# Patient Record
Sex: MALE | Race: BLACK OR AFRICAN AMERICAN | Employment: PART TIME | URBAN - METROPOLITAN AREA
[De-identification: names, ages, dates, MRNs, and addresses within clinical notes are randomized per-mention and may not be internally consistent; named-entity substitution may affect disease eponyms.]

---
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
## 2020-02-23 ENCOUNTER — HEALTH MAINTENANCE LETTER (OUTPATIENT)
Age: 20
End: 2020-02-23

## 2020-12-13 ENCOUNTER — HEALTH MAINTENANCE LETTER (OUTPATIENT)
Age: 20
End: 2020-12-13

## 2021-02-25 ENCOUNTER — VIRTUAL VISIT (OUTPATIENT)
Dept: FAMILY MEDICINE | Facility: CLINIC | Age: 21
End: 2021-02-25
Payer: COMMERCIAL

## 2021-02-25 DIAGNOSIS — R07.9 CHEST PAIN, UNSPECIFIED TYPE: Primary | ICD-10-CM

## 2021-02-25 PROCEDURE — 99214 OFFICE O/P EST MOD 30 MIN: CPT | Mod: 95 | Performed by: PHYSICIAN ASSISTANT

## 2021-02-25 NOTE — PROGRESS NOTES
Pelon is a 20 year old who is being evaluated via a billable telephone visit.      What phone number would you like to be contacted at? [phone]   [facility]    Assessment & Plan       ICD-10-CM    1. Chest pain, unspecified type  R07.9      Cannot rule out cardiac etiology. He will have his mom drive him to urgent care.                  No follow-ups on file.    Josephine Calderon PA-C  [facility]   Pelon is a 20 year old who presents for the following health issues     HPI         Concern for COVID-19  About how many days ago did these symptoms start? 2 days  Is this your first visit for this illness? Yes  In the 14 days before your symptoms started, have you had close contact with someone with COVID-19 (Coronavirus)? No  Do you have a fever or chills? No  Are you having new or worsening difficulty breathing? No  Do you have new or worsening cough? No  Have you had any new or unexplained body aches? YES    Have you experienced any of the following NEW symptoms?    Headache: No    Sore throat: No    Loss of taste or smell: No    Chest pain: YES    Diarrhea: No    Rash: No  What treatments have you tried? None  Who do you live with? Mother  Are you, or a household member, a healthcare worker or a ? No  Do you live in a nursing home, group home, or shelter? No  Do you have a way to get food/medications if quarantined? Yes, I have a friend or family member who can help me.    Sharp pain in his chest and left arm. Not worse with taking a deep breath  Started 3-4 days ago  About a 3/10   No exertional chest pain  It doesn't bother him at all when he's at work  No underlying health conditions    Review of Systems   CONSTITUTIONAL: NEGATIVE for fever, chills, change in weight  ENT/MOUTH: NEGATIVE for ear, mouth and throat problems  RESP: NEGATIVE for significant cough or SOB  CV: NEGATIVE for irregular heart beat and lower  extremity edema      Objective           Vitals:  No vitals were obtained today due to virtual visit.    Physical Exam   healthy, alert and no distress  PSYCH: Alert and oriented times 3; coherent speech, normal   rate and volume, able to articulate logical thoughts, able   to abstract reason, no tangential thoughts, no hallucinations   or delusions  His affect is normal  RESP: No cough, no audible wheezing, able to talk in full sentences  Remainder of exam unable to be completed due to telephone visits                Phone call duration: 5 minutes

## 2021-02-28 ENCOUNTER — OFFICE VISIT (OUTPATIENT)
Dept: URGENT CARE | Facility: URGENT CARE | Age: 21
End: 2021-02-28
Payer: COMMERCIAL

## 2021-02-28 VITALS
HEART RATE: 72 BPM | SYSTOLIC BLOOD PRESSURE: 138 MMHG | DIASTOLIC BLOOD PRESSURE: 73 MMHG | RESPIRATION RATE: 16 BRPM | OXYGEN SATURATION: 99 % | BODY MASS INDEX: 23.57 KG/M2 | WEIGHT: 150.5 LBS | TEMPERATURE: 98.5 F

## 2021-02-28 DIAGNOSIS — F41.1 GAD (GENERALIZED ANXIETY DISORDER): ICD-10-CM

## 2021-02-28 DIAGNOSIS — R07.89 ATYPICAL CHEST PAIN: Primary | ICD-10-CM

## 2021-02-28 PROCEDURE — 99214 OFFICE O/P EST MOD 30 MIN: CPT | Mod: 25 | Performed by: FAMILY MEDICINE

## 2021-02-28 PROCEDURE — 93000 ELECTROCARDIOGRAM COMPLETE: CPT | Performed by: FAMILY MEDICINE

## 2021-02-28 NOTE — LETTER
February 28, 2021      Pelon Robertson  [address]   [place] [address]        To Whom It May Concern:    Pelon Robertson  was seen on 02/28/2021.  Please excuse him from work from 02/24/2021 until 02/28/2021 due to illness.  Return to work today, 02/28/2021, with no restrictions.        Sincerely,        Vance Valdovinos MD

## 2021-02-28 NOTE — PROGRESS NOTES
Assessment & Plan     Atypical chest pain  Normal EKG, likely stress related.  Given a note to go back to work.  Patient denies depression, denies alcohol abuse or drugs abuse.  - EKG 12-lead complete w/read - Clinics    WIL (generalized anxiety disorder)  Patient declined to start any medication at this point.    Advised patient to follow-up with his primary care physician in the next 1-2  weeks or sooner to discuss option to help with his generalized anxiety disorder.      Work on weight loss  Regular exercise    No follow-ups on file.    Vance Valdovinos MD  [facility] [place] WALT Bird is a 20 year old who presents for the following health issues :  Chest pain on and off for the past 2 1/2 weeks.  Comes and goes. For a few seconds.  Mostly patient feels it when he is at home, sitting or relaxing. No chest pain during sleeping. Sleeps well at night    He denies chest palpitation, denies radiating to his left side, jaw, has no nausea, no vomiting no other symptoms.    Patient reports he feels, a brief  sharp pain to his left side of the chest a few for a few seconds.  Then goes away.  He has no other symptoms with it.    He does report stress, however, denies anxiety, denies depression, denies alcohol abuse or drugs abuse.  Does not smoke.  Does not drink alcohol.  Does not drink coffee or energy drink.    No family history of heart disease.  HPI   Review of Systems   Constitutional, HEENT, cardiovascular, pulmonary, GI, , musculoskeletal, neuro, skin, endocrine and psych systems are negative, except as otherwise noted.      Objective    /73 (BP Location: Left arm, Patient Position: Chair, Cuff Size: Adult Regular)   Pulse 72   Temp 98.5  F (36.9  C) (Tympanic)   Resp 16   Wt 68.3 kg (150 lb 8 oz)   SpO2 99%   BMI 23.57 kg/m    Body mass index is 23.57 kg/m .  Physical Exam   GENERAL: healthy, alert and no distress  NECK: no adenopathy, no asymmetry, masses,  or scars and thyroid normal to palpation  RESP: lungs clear to auscultation - no rales, rhonchi or wheezes  CV: regular rate and rhythm, normal S1 S2, no S3 or S4, no murmur, click or rub, no peripheral edema and peripheral pulses strong  ABDOMEN: soft, nontender, no hepatosplenomegaly, no masses and bowel sounds normal  MS: no gross musculoskeletal defects noted, no edema  BACK: no CVA tenderness, no paralumbar tenderness  PSYCH: mentation appears normal, affect normal/bright    EKG - Reviewed and interpreted by me appears normal, NSR, normal axis, normal intervals, no acute ST/T changes c/w ischemia      Vance Valdovinos MD

## 2021-02-28 NOTE — PATIENT INSTRUCTIONS
Patient Education     Understanding Generalized Anxiety Disorder (WIL)  Anxiety can fill you with worry and fear. Sometimes anxiety is healthy. It alerts you to a potential threat and gets you to respond and take action. But for some people, anxiety gets so bad it causes problems in daily life. If you find yourself in a constant state of anxiety, you may have an anxiety disorder called generalized anxiety disorder (WIL). Speak with your healthcare provider or mental health professional to learn more. He or she can help.      What is generalized anxiety disorder?  WIL means that you are worrying constantly and can t control the worrying. Healthcare providers diagnose WIL when your worrying happens on most days and for at least 6 months.   With WIL, you might worry about money, your family and friends, work, or the world in general. You might not even be sure what you're anxious about. But whatever it is, you have an intense fear that the worst will happen. These feelings never really go away. In people age 65 and older, WIL is one of the most commonly diagnosed anxiety disorders.  Many times it occurs with depression. This constant worry affects your quality of life and makes it hard to function. WIL can cause physical symptoms, too.   What are common symptoms of generalized anxiety disorder?  People with WIL often think they have a physical illness. The disorder can cause symptoms, such as:     Muscle tension, especially in the neck and shoulders    Nausea and stomach problems    Frequent headaches    Feeling lightheaded    Restlessness, trouble sleeping    Feeling irritable and on edge all the time  How can generalized anxiety disorder be treated?  WIL can be treated with medicine, talk therapy (also called counseling), or both. Medicine helps to reduce symptoms, so you can continue with your daily routine. Therapy helps you understand the cause of your anxiety and learn how to manage it. Both forms of treatment  help you deal with problems that anxiety causes in your life. This helps you to be healthier and happier.   [employer] last reviewed this educational content on 5/1/2020 2000-2020 [employer]. [address] [place] [address], [place] [address]. All rights reserved. This information is not intended as a substitute for professional medical care. Always follow your healthcare professional's instructions.

## 2021-04-11 ENCOUNTER — HEALTH MAINTENANCE LETTER (OUTPATIENT)
Age: 21
End: 2021-04-11

## 2021-09-26 ENCOUNTER — HEALTH MAINTENANCE LETTER (OUTPATIENT)
Age: 21
End: 2021-09-26

## 2022-05-08 ENCOUNTER — HEALTH MAINTENANCE LETTER (OUTPATIENT)
Age: 22
End: 2022-05-08

## 2023-01-08 ENCOUNTER — HEALTH MAINTENANCE LETTER (OUTPATIENT)
Age: 23
End: 2023-01-08

## 2023-06-02 ENCOUNTER — HEALTH MAINTENANCE LETTER (OUTPATIENT)
Age: 23
End: 2023-06-02